# Patient Record
Sex: FEMALE | HISPANIC OR LATINO
[De-identification: names, ages, dates, MRNs, and addresses within clinical notes are randomized per-mention and may not be internally consistent; named-entity substitution may affect disease eponyms.]

---

## 2019-09-27 ENCOUNTER — RX ONLY (RX ONLY)
Age: 24
End: 2019-09-27

## 2019-09-27 ENCOUNTER — DOCTOR'S OFFICE (OUTPATIENT)
Dept: URBAN - NONMETROPOLITAN AREA CLINIC 1 | Facility: CLINIC | Age: 24
Setting detail: OPHTHALMOLOGY
End: 2019-09-27
Payer: COMMERCIAL

## 2019-09-27 DIAGNOSIS — D23.121: ICD-10-CM

## 2019-09-27 PROCEDURE — 92285 EXTERNAL OCULAR PHOTOGRAPHY: CPT | Performed by: OPHTHALMOLOGY

## 2019-09-27 PROCEDURE — 99203 OFFICE O/P NEW LOW 30 MIN: CPT | Performed by: OPHTHALMOLOGY

## 2019-09-27 ASSESSMENT — REFRACTION_CURRENTRX
OD_OVR_VA: 20/
OS_OVR_VA: 20/

## 2019-09-27 ASSESSMENT — REFRACTION_MANIFEST
OS_VA2: 20/
OS_VA2: 20/
OD_VA3: 20/
OS_VA3: 20/
OS_VA1: 20/
OD_VA1: 20/
OS_VA1: 20/
OD_VA2: 20/
OD_VA2: 20/
OU_VA: 20/
OS_VA3: 20/
OD_VA1: 20/
OU_VA: 20/
OD_VA3: 20/

## 2019-09-27 ASSESSMENT — REFRACTION_AUTOREFRACTION
OD_CYLINDER: -0.75
OD_AXIS: 063
OD_SPHERE: +0.25
OS_CYLINDER: -0.50
OS_AXIS: 139
OS_SPHERE: +0.25

## 2019-09-27 ASSESSMENT — CONFRONTATIONAL VISUAL FIELD TEST (CVF)
OS_FINDINGS: FULL
OD_FINDINGS: FULL

## 2019-09-27 ASSESSMENT — VISUAL ACUITY
OD_BCVA: 20/20-1
OS_BCVA: 20/20-2

## 2019-09-27 ASSESSMENT — SPHEQUIV_DERIVED
OS_SPHEQUIV: 0
OD_SPHEQUIV: -0.125

## 2019-10-11 ENCOUNTER — RX ONLY (RX ONLY)
Age: 24
End: 2019-10-11

## 2019-10-11 ENCOUNTER — DOCTOR'S OFFICE (OUTPATIENT)
Dept: URBAN - NONMETROPOLITAN AREA CLINIC 1 | Facility: CLINIC | Age: 24
Setting detail: OPHTHALMOLOGY
End: 2019-10-11
Payer: COMMERCIAL

## 2019-10-11 DIAGNOSIS — H00.14: ICD-10-CM

## 2019-10-11 PROCEDURE — 92012 INTRM OPH EXAM EST PATIENT: CPT | Performed by: OPHTHALMOLOGY

## 2019-10-11 PROCEDURE — 11400 EXC TR-EXT B9+MARG 0.5 CM<: CPT | Performed by: OPHTHALMOLOGY

## 2019-10-14 PROBLEM — D23.121 OTHER BENIGN NEOPLASM OF SKIN OF LEFT UPPER EYELID, INCLUDING CANTHUS: Status: ACTIVE | Noted: 2019-09-27

## 2019-10-14 PROBLEM — H00.14 CHALAZION; LEFT UPPER LID: Status: ACTIVE | Noted: 2019-10-11

## 2019-10-14 ASSESSMENT — REFRACTION_AUTOREFRACTION
OS_SPHERE: +0.25
OD_CYLINDER: -0.75
OD_SPHERE: +0.25
OS_CYLINDER: -0.50
OD_AXIS: 063
OS_AXIS: 139

## 2019-10-14 ASSESSMENT — REFRACTION_MANIFEST
OS_VA2: 20/
OD_VA1: 20/
OS_VA3: 20/
OU_VA: 20/
OD_VA2: 20/
OS_VA2: 20/
OS_VA1: 20/
OD_VA2: 20/
OD_VA3: 20/
OS_VA1: 20/
OD_VA1: 20/
OD_VA3: 20/
OS_VA3: 20/
OU_VA: 20/

## 2019-10-14 ASSESSMENT — REFRACTION_CURRENTRX
OD_OVR_VA: 20/
OS_OVR_VA: 20/

## 2019-10-14 ASSESSMENT — SPHEQUIV_DERIVED
OD_SPHEQUIV: -0.125
OS_SPHEQUIV: 0

## 2019-10-14 ASSESSMENT — VISUAL ACUITY
OD_BCVA: 20/20-1
OS_BCVA: 20/20-1

## 2023-01-07 ENCOUNTER — APPOINTMENT (EMERGENCY)
Dept: ULTRASOUND IMAGING | Facility: HOSPITAL | Age: 28
End: 2023-01-07

## 2023-01-07 ENCOUNTER — HOSPITAL ENCOUNTER (EMERGENCY)
Facility: HOSPITAL | Age: 28
Discharge: HOME | End: 2023-01-07
Attending: EMERGENCY MEDICINE

## 2023-01-07 VITALS
WEIGHT: 123.24 LBS | HEART RATE: 82 BPM | DIASTOLIC BLOOD PRESSURE: 79 MMHG | SYSTOLIC BLOOD PRESSURE: 126 MMHG | TEMPERATURE: 98 F | OXYGEN SATURATION: 98 % | RESPIRATION RATE: 16 BRPM

## 2023-01-07 DIAGNOSIS — O20.0 THREATENED MISCARRIAGE: Primary | ICD-10-CM

## 2023-01-07 DIAGNOSIS — O00.90 ECTOPIC PREGNANCY: ICD-10-CM

## 2023-01-07 LAB
ANION GAP SERPL CALCULATED.3IONS-SCNC: 5 MMOL/L (ref 4–13)
B-HCG SERPL-ACNC: ABNORMAL MIU/ML (ref 0–11.6)
BASOPHILS # BLD AUTO: 0.04 THOUSANDS/ÂΜL (ref 0–0.1)
BASOPHILS NFR BLD AUTO: 1 % (ref 0–1)
BILIRUB UR QL STRIP: NEGATIVE
BUN SERPL-MCNC: 14 MG/DL (ref 5–25)
CALCIUM SERPL-MCNC: 9.2 MG/DL (ref 8.3–10.1)
CHLORIDE SERPL-SCNC: 104 MMOL/L (ref 96–108)
CLARITY UR: CLEAR
CO2 SERPL-SCNC: 29 MMOL/L (ref 21–32)
COLOR UR: YELLOW
CREAT SERPL-MCNC: 0.91 MG/DL (ref 0.6–1.3)
EOSINOPHIL # BLD AUTO: 0.1 THOUSAND/ÂΜL (ref 0–0.61)
EOSINOPHIL NFR BLD AUTO: 1 % (ref 0–6)
ERYTHROCYTE [DISTWIDTH] IN BLOOD BY AUTOMATED COUNT: 12.2 % (ref 11.6–15.1)
EXT PREGNANCY TEST URINE: POSITIVE
EXT. CONTROL: ABNORMAL
GFR SERPL CREATININE-BSD FRML MDRD: 86 ML/MIN/1.73SQ M
GLUCOSE SERPL-MCNC: 103 MG/DL (ref 65–140)
GLUCOSE UR STRIP-MCNC: NEGATIVE MG/DL
HCT VFR BLD AUTO: 40.9 % (ref 34.8–46.1)
HGB BLD-MCNC: 13.1 G/DL (ref 11.5–15.4)
HGB UR QL STRIP.AUTO: NEGATIVE
IMM GRANULOCYTES # BLD AUTO: 0.03 THOUSAND/UL (ref 0–0.2)
IMM GRANULOCYTES NFR BLD AUTO: 0 % (ref 0–2)
KETONES UR STRIP-MCNC: ABNORMAL MG/DL
LEUKOCYTE ESTERASE UR QL STRIP: NEGATIVE
LYMPHOCYTES # BLD AUTO: 2.85 THOUSANDS/ÂΜL (ref 0.6–4.47)
LYMPHOCYTES NFR BLD AUTO: 34 % (ref 14–44)
MCH RBC QN AUTO: 31.1 PG (ref 26.8–34.3)
MCHC RBC AUTO-ENTMCNC: 32 G/DL (ref 31.4–37.4)
MCV RBC AUTO: 97 FL (ref 82–98)
MONOCYTES # BLD AUTO: 0.36 THOUSAND/ÂΜL (ref 0.17–1.22)
MONOCYTES NFR BLD AUTO: 4 % (ref 4–12)
NEUTROPHILS # BLD AUTO: 4.98 THOUSANDS/ÂΜL (ref 1.85–7.62)
NEUTS SEG NFR BLD AUTO: 60 % (ref 43–75)
NITRITE UR QL STRIP: NEGATIVE
NRBC BLD AUTO-RTO: 0 /100 WBCS
PH UR STRIP.AUTO: 6 [PH]
PLATELET # BLD AUTO: 316 THOUSANDS/UL (ref 149–390)
PMV BLD AUTO: 9.8 FL (ref 8.9–12.7)
POTASSIUM SERPL-SCNC: 3.6 MMOL/L (ref 3.5–5.3)
PROT UR STRIP-MCNC: NEGATIVE MG/DL
RBC # BLD AUTO: 4.21 MILLION/UL (ref 3.81–5.12)
SODIUM SERPL-SCNC: 138 MMOL/L (ref 135–147)
SP GR UR STRIP.AUTO: 1.02 (ref 1–1.03)
UROBILINOGEN UR QL STRIP.AUTO: 0.2 E.U./DL
WBC # BLD AUTO: 8.36 THOUSAND/UL (ref 4.31–10.16)

## 2023-01-07 NOTE — ED PROVIDER NOTES
History  Chief Complaint   Patient presents with   • Threatened Miscarriage     Did a home pregnancy test  and had a blood test on the  result of HCG was 363, today had some pink discharge and passed some blood tinged mucous sent message to [facility] without call back so came to the ED for evaluation  HPI  27F presenting with vaginal discharge  Patient did a home pregnancy test on  that was positive  She had a blood test on  via her OB/GYN provider and said it was 363  She was planning to follow-up with her provider because she is thinking about an , however today, she passed blood tinged mucous from her vaginal region  Has some abdominal cramping at that time, however it has resolved  Last menstrual period   Denies any other vaginal bleeding, dysuria, hematuria, nausea/vomiting  She has had 3 previous pregnancies with 2 live births and 1   None       History reviewed  No pertinent past medical history  History reviewed  No pertinent surgical history  History reviewed  No pertinent family history  I have reviewed and agree with the history as documented  E-Cigarette/Vaping   • E-Cigarette Use Current Every Day User      E-Cigarette/Vaping Substances   • Nicotine Yes      Social History     Tobacco Use   • Smoking status: Never   • Smokeless tobacco: Never   Vaping Use   • Vaping Use: Every day   • Substances: Nicotine   Substance Use Topics   • Alcohol use: Yes     Comment: occasionaly   • Drug use: Yes     Types: Marijuana       Review of Systems   Constitutional: Negative for chills and fever  HENT: Negative for ear pain and sore throat  Eyes: Negative for pain and visual disturbance  Respiratory: Negative for cough and shortness of breath  Cardiovascular: Negative for chest pain and palpitations  Gastrointestinal: Negative for abdominal pain and vomiting  Genitourinary: Negative for dysuria and hematuria     Musculoskeletal: Negative for arthralgias and back pain  Skin: Negative for color change and rash  Neurological: Negative for seizures and syncope  All other systems reviewed and are negative  Physical Exam  Physical Exam  Vitals and nursing note reviewed  Constitutional:       General: She is not in acute distress  Appearance: She is well-developed  HENT:      Head: Normocephalic and atraumatic  Right Ear: External ear normal       Left Ear: External ear normal       Nose: Nose normal    Eyes:      Conjunctiva/sclera: Conjunctivae normal    Cardiovascular:      Rate and Rhythm: Normal rate and regular rhythm  Pulmonary:      Effort: Pulmonary effort is normal  No respiratory distress  Breath sounds: Normal breath sounds  Abdominal:      Palpations: Abdomen is soft  Tenderness: There is no abdominal tenderness  Musculoskeletal:      Cervical back: Normal range of motion and neck supple  Skin:     General: Skin is warm and dry  Neurological:      General: No focal deficit present  Mental Status: She is alert  Mental status is at baseline           Vital Signs  ED Triage Vitals [01/07/23 1721]   Temperature Pulse Respirations Blood Pressure SpO2   98 °F (36 7 °C) 82 16 126/79 98 %      Temp Source Heart Rate Source Patient Position - Orthostatic VS BP Location FiO2 (%)   Temporal Monitor Sitting Left arm --      Pain Score       --           Vitals:    01/07/23 1721   BP: 126/79   Pulse: 82   Patient Position - Orthostatic VS: Sitting         Visual Acuity      ED Medications  Medications - No data to display    Diagnostic Studies  Results Reviewed     Procedure Component Value Units Date/Time    hCG, quantitative [[id number]]  (Abnormal) Collected: 01/07/23 1814    Lab Status: Final result Specimen: Blood from Arm, Left Updated: 01/07/23 1917     HCG, Quant 25,205 mIU/mL     Narrative:       Expected Ranges:     Approximate               Approximate HCG  Gestation age          Concentration ( mIU/mL)  _____________          ______________________   Corby King                      HCG values  0 2-1                       5-50  1-2                           2-3                         100-5000  3-4                         500-49112  4-5                         1000-02709  5-6                         [id number]  6-8                         51610-905642  8-12                        84314-599138      UA w Reflex to Microscopic w Reflex to Culture [[id number]]  (Abnormal) Collected: 01/07/23 1824    Lab Status: Final result Specimen: Urine, Clean Catch Updated: 01/07/23 1907     Color, UA Yellow     Clarity, UA Clear     Specific [place], UA 1 020     pH, UA 6 0     Leukocytes, UA Negative     Nitrite, UA Negative     Protein, UA Negative mg/dl      Glucose, UA Negative mg/dl      Ketones, UA 15 (1+) mg/dl      Urobilinogen, UA 0 2 E U /dl      Bilirubin, UA Negative     Occult Blood, UA Negative    Basic metabolic panel [[id number]] Collected: 01/07/23 1814    Lab Status: Final result Specimen: Blood from Arm, Left Updated: 01/07/23 1829     Sodium 138 mmol/L      Potassium 3 6 mmol/L      Chloride 104 mmol/L      CO2 29 mmol/L      ANION GAP 5 mmol/L      BUN 14 mg/dL      Creatinine 0 91 mg/dL      Glucose 103 mg/dL      Calcium 9 2 mg/dL      eGFR 86 ml/min/1 73sq m     Narrative:      [place] guidelines for Chronic Kidney Disease (CKD):   •  Stage 1 with normal or high GFR (GFR > 90 mL/min/1 73 square meters)  •  Stage 2 Mild CKD (GFR = 60-89 mL/min/1 73 square meters)  •  Stage 3A Moderate CKD (GFR = 45-59 mL/min/1 73 square meters)  •  Stage 3B Moderate CKD (GFR = 30-44 mL/min/1 73 square meters)  •  Stage 4 Severe CKD (GFR = 15-29 mL/min/1 73 square meters)  •  Stage 5 End Stage CKD (GFR <15 mL/min/1 73 square meters)  Note: GFR calculation is accurate only with a steady state creatinine    CBC and differential [[id number]] Collected: 01/07/23 1814    Lab Status: Final result Specimen: Blood from Arm, Left Updated: 01/07/23 1819     WBC 8 36 Thousand/uL      RBC 4 21 Million/uL      Hemoglobin 13 1 g/dL      Hematocrit 40 9 %      MCV 97 fL      MCH 31 1 pg      MCHC 32 0 g/dL      RDW 12 2 %      MPV 9 8 fL      Platelets 310 Thousands/uL      nRBC 0 /100 WBCs      Neutrophils Relative 60 %      Immat GRANS % 0 %      Lymphocytes Relative 34 %      Monocytes Relative 4 %      Eosinophils Relative 1 %      Basophils Relative 1 %      Neutrophils Absolute 4 98 Thousands/µL      Immature Grans Absolute 0 03 Thousand/uL      Lymphocytes Absolute 2 85 Thousands/µL      Monocytes Absolute 0 36 Thousand/µL      Eosinophils Absolute 0 10 Thousand/µL      Basophils Absolute 0 04 Thousands/µL     POCT pregnancy, urine [[id number]]  (Abnormal) Resulted: 01/07/23 1738    Lab Status: Final result Updated: 01/07/23 1738     EXT Preg Test, Ur Positive     Control Valid             US OB pregnancy limited with transvaginal    (Results Pending)          Procedures  Procedures     ED Course  ED Course as of 01/07/23 1956   Sat Jan 07, 2023   1834 WBC: 8 36   1834 Hemoglobin: 13 1   1834 Sodium: 138   1834 Potassium: 3 6   1917 HCG QUANTITATIVE(!): 25,205  Transvaginal US ordered  On-call ultrasound tech informed  SBIRT 20yo+    Flowsheet Row Most Recent Value   SBIRT (25 yo +)    In order to provide better care to our patients, we are screening all of our patients for alcohol and drug use  Would it be okay to ask you these screening questions? No Filed at: 01/07/2023 1809        Medical Decision Making  27F [id number] presenting w mucous vaginal discharge  +positive pregnancy test and beta-hCG 300s approx a week ago  CBC and CMP wnl  Urinalysis w/o signs of infection  Beta hCG significantly elevated at 25,205  Will obtain transvaginal US to evaluate for intrauterine vs ectopic pregnancy  Signed out to Dr Amy Hernández to follow-up on ultrasound findings      Amount and/or Complexity of Data Reviewed  Labs: Decision-making details documented in ED Course  Radiology: ordered  Disposition  Final diagnoses:   None     ED Disposition     None      Follow-up Information    None         Patient's Medications    No medications on file       No discharge procedures on file      PDMP Review     None          ED Provider  Electronically Signed by           Kapil Mccormick MD  01/07/23 2001

## 2023-01-08 NOTE — DISCHARGE INSTRUCTIONS
Your labwork and urinalysis were reassuring  Your ultrasound showed ___  Please follow-up with your OB/GYN to discuss your options

## 2023-01-08 NOTE — ED CARE HANDOFF
Emergency Department Sign Out Note        Sign out and transfer of care from Dr Kierra Walker  See Separate Emergency Department note  The patient, Rossy Childress, was evaluated by the previous provider for pregnancy problem      Workup Completed:  Labs Reviewed   HCG, QUANTITATIVE - Abnormal       Result Value Ref Range Status    HCG, Quant 25,205 (*) 0 - 11 6 mIU/mL Final    Narrative:      Expected Ranges:     Approximate               Approximate HCG  Gestation age          Concentration ( mIU/mL)  _____________          ______________________   Timmy Deal                      HCG values  0 2-1                       5-50  1-2                           2-3                         100-5000  3-4                         500-18769  4-5                         1000-14404  5-6                         47953-474797  6-8                         55922-374318  8-12                        30148-173745     UA W REFLEX TO MICROSCOPIC WITH REFLEX TO CULTURE - Abnormal    Color, UA Yellow   Final    Clarity, UA Clear   Final    Specific Gravity, UA 1 020  1 003 - 1 030 Final    pH, UA 6 0  4 5, 5 0, 5 5, 6 0, 6 5, 7 0, 7 5, 8 0 Final    Leukocytes, UA Negative  Negative Final    Nitrite, UA Negative  Negative Final    Protein, UA Negative  Negative mg/dl Final    Glucose, UA Negative  Negative mg/dl Final    Ketones, UA 15 (1+) (*) Negative mg/dl Final    Urobilinogen, UA 0 2  0 2, 1 0 E U /dl E U /dl Final    Bilirubin, UA Negative  Negative Final    Occult Blood, UA Negative  Negative Final   POCT PREGNANCY, URINE - Abnormal    EXT Preg Test, Ur Positive (*)  Final    Control Valid   Final   CBC AND DIFFERENTIAL    WBC 8 36  4 31 - 10 16 Thousand/uL Final    RBC 4 21  3 81 - 5 12 Million/uL Final    Hemoglobin 13 1  11 5 - 15 4 g/dL Final    Hematocrit 40 9  34 8 - 46 1 % Final    MCV 97  82 - 98 fL Final    MCH 31 1  26 8 - 34 3 pg Final    MCHC 32 0  31 4 - 37 4 g/dL Final    RDW 12 2  11 6 - 15 1 % Final    MPV 9 8  8 9 - 12 7 fL Final    Platelets 540  438 - 390 Thousands/uL Final    nRBC 0  /100 WBCs Final    Neutrophils Relative 60  43 - 75 % Final    Immat GRANS % 0  0 - 2 % Final    Lymphocytes Relative 34  14 - 44 % Final    Monocytes Relative 4  4 - 12 % Final    Eosinophils Relative 1  0 - 6 % Final    Basophils Relative 1  0 - 1 % Final    Neutrophils Absolute 4 98  1 85 - 7 62 Thousands/µL Final    Immature Grans Absolute 0 03  0 00 - 0 20 Thousand/uL Final    Lymphocytes Absolute 2 85  0 60 - 4 47 Thousands/µL Final    Monocytes Absolute 0 36  0 17 - 1 22 Thousand/µL Final    Eosinophils Absolute 0 10  0 00 - 0 61 Thousand/µL Final    Basophils Absolute 0 04  0 00 - 0 10 Thousands/µL Final   BASIC METABOLIC PANEL    Sodium 912  135 - 147 mmol/L Final    Potassium 3 6  3 5 - 5 3 mmol/L Final    Chloride 104  96 - 108 mmol/L Final    CO2 29  21 - 32 mmol/L Final    ANION GAP 5  4 - 13 mmol/L Final    BUN 14  5 - 25 mg/dL Final    Creatinine 0 91  0 60 - 1 30 mg/dL Final    Comment: Standardized to IDMS reference method    Glucose 103  65 - 140 mg/dL Final    Comment: If the patient is fasting, the ADA then defines impaired fasting glucose as > 100 mg/dL and diabetes as > or equal to 123 mg/dL  Specimen collection should occur prior to Sulfasalazine administration due to the potential for falsely depressed results  Specimen collection should occur prior to Sulfapyridine administration due to the potential for falsely elevated results      Calcium 9 2  8 3 - 10 1 mg/dL Final    eGFR 86  ml/min/1 73sq m Final    Narrative:     [place] guidelines for Chronic Kidney Disease (CKD):   •  Stage 1 with normal or high GFR (GFR > 90 mL/min/1 73 square meters)  •  Stage 2 Mild CKD (GFR = 60-89 mL/min/1 73 square meters)  •  Stage 3A Moderate CKD (GFR = 45-59 mL/min/1 73 square meters)  •  Stage 3B Moderate CKD (GFR = 30-44 mL/min/1 73 square meters)  •  Stage 4 Severe CKD (GFR = 15-29 mL/min/1 73 square meters)  • Stage 5 End Stage CKD (GFR <15 mL/min/1 73 square meters)  Note: GFR calculation is accurate only with a steady state creatinine         ED Course / Workup Pending (followup):  US OB < 14 weeks with transvaginal    Result Date: 1/7/2023  Narrative: FIRST TRIMESTER OBSTETRIC ULTRASOUND, COMPLETE INDICATION: Pelvic pain, vaginal bleeding  Last menstrual period 12/4/2022  COMPARISON: None  TECHNIQUE:   Transabdominal ultrasound of the pelvis was performed  Additional transvaginal imaging was then performed to better assess the gestation, myometrial/endometrial architecture and ovarian parenchymal detail  The study includes volumetric sweeps and traditional still imaging technique  FINDINGS: 11 mm intrauterine gestational sac containing a 1 mm yolk sac without fetal pole  Short-term follow-up with serial beta-hCG and pelvic/OB ultrasound recommended in 2 weeks  Bilateral ovaries appear unremarkable  The cervix remains closed  No free fluid present  Impression: 11 mm intrauterine gestational sac containing a 1 mm yolk sac without fetal pole  Short-term follow-up with serial beta-hCG and pelvic/OB ultrasound recommended in 2 weeks  The study was marked in EPIC for significant notification  Workstation performed: [id number]                                      ED Course as of 01/07/23 2049   Sat Jan 07, 2023   1002 Discussed and care assumed from Dr Mortimer Motts pending ultrasound pelvis rule out ectopic pregnancy  Patient with symptoms of threatened miscarriage stable in the ED currently review of prior labs reveals she is Rh+      2023 I was advised by nursing that patient is requesting to leave [address] following her ultrasound she states that she cannot wait for the results and wishes to leave    I spoke with patient in the ED advised that the reason for the ultrasound is to rule out a potentially life-threatening condition ectopic pregnancy and advised that it would be medically advisable for her to leave without obtaining these results  Patient is adamant that she wishes to leave for she was advised by me of the risk including death and loss of current function and still wished to leave she is of sound mind there is no grounds for involuntary commitment  Patient advised to follow-up promptly with her obstetrician for further evaluation and treatment and obtain test results she understands she is welcome to return  return precautions and anticipatory guidance discussed  After leaving the department patient was contacted and informed of test results on ultrasound and advised she needs to follow-up promptly with obstetrician for further evaluation and management of pregnancy  Procedures  MDM        Disposition  Final diagnoses:   Threatened miscarriage   Ectopic pregnancy     Time reflects when diagnosis was documented in both MDM as applicable and the Disposition within this note     Time User Action Codes Description Comment    1/7/2023  8:21 PM Rupinder Flatter [O20 0] Threatened miscarriage     1/7/2023  8:21 PM Raphael Lipoma Add [O00 90] Ectopic pregnancy       ED Disposition     ED Disposition   AMA    Condition   --    Date/Time   Sat Jan 7, 2023  8:21 PM    Comment   Date: 1/7/2023  Patient: Rossy Childress  Admitted: 1/7/2023  6:06 PM  Attending Provider: Judeen Lesches, DO    Rossy Childress or her authorized caregiver has made the decision for the patient to leave the emergency department against the advice of [facility] department staff  She or her authorized caregiver has been informed and understands the inherent risks, including death, loss of current function  She or her authorized caregiver has decided to accept the responsibility for this decisio n  Rossy Childress and all necessary parties have been advised that she may return for further evaluation or treatment  Her condition at time of discharge was stable    Rossy Childress had current vital signs as follows:  /79 (BP Location: Left ar m)   Pulse 82   Temp 98 °F (36 7 °C) (Temporal)   Resp 16   Wt 55 9 kg (123 lb 3 8 oz)   LMP 12/04/2022 (Exact Date)            Follow-up Information     Follow up With Specialties Details Why Contact Info      Schedule an appointment as soon as possible for a visit in 1 day  Your obstetrician        There are no discharge medications for this patient  No discharge procedures on file         ED Provider  Electronically Signed by     Millie Luevano DO  01/07/23 9158       Millie Luevano DO  01/07/23 2045